# Patient Record
Sex: FEMALE | Race: WHITE
[De-identification: names, ages, dates, MRNs, and addresses within clinical notes are randomized per-mention and may not be internally consistent; named-entity substitution may affect disease eponyms.]

---

## 2019-10-29 ENCOUNTER — HOSPITAL ENCOUNTER (EMERGENCY)
Dept: HOSPITAL 26 - MED | Age: 39
Discharge: HOME | End: 2019-10-29
Payer: SELF-PAY

## 2019-10-29 VITALS — BODY MASS INDEX: 25.61 KG/M2 | HEIGHT: 64 IN | WEIGHT: 150 LBS

## 2019-10-29 VITALS — SYSTOLIC BLOOD PRESSURE: 128 MMHG | DIASTOLIC BLOOD PRESSURE: 85 MMHG

## 2019-10-29 VITALS — DIASTOLIC BLOOD PRESSURE: 90 MMHG | SYSTOLIC BLOOD PRESSURE: 127 MMHG
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
## 2019-10-29 DIAGNOSIS — M25.511: ICD-10-CM
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
## 2019-10-29 DIAGNOSIS — S16.1XXA: Primary | ICD-10-CM

## 2019-10-29 DIAGNOSIS — S20.219A: ICD-10-CM
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
## 2019-10-29 DIAGNOSIS — M54.9: ICD-10-CM

## 2019-10-29 DIAGNOSIS — M25.512: ICD-10-CM

## 2019-10-29 PROCEDURE — 71045 X-RAY EXAM CHEST 1 VIEW: CPT

## 2019-10-29 PROCEDURE — 81025 URINE PREGNANCY TEST: CPT

## 2019-10-29 PROCEDURE — 99283 EMERGENCY DEPT VISIT LOW MDM: CPT
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
## 2019-10-29 NOTE — NUR
Patient discharged with v/s stable. Written and verbal after care instructions 
given and explained. 

Patient alert, oriented and verbalized understanding of instructions. 
Ambulatory with steady gait. All questions addressed prior to discharge. ID 
band removed. Patient advised to follow up with PMD. Rx of  ROBAXIN, AND 
TYLENOL ES given. Patient educated on indication of medication including 
possible reaction and side effects. Opportunity to ask questions provided and 
answered.